# Patient Record
Sex: FEMALE | URBAN - METROPOLITAN AREA
[De-identification: names, ages, dates, MRNs, and addresses within clinical notes are randomized per-mention and may not be internally consistent; named-entity substitution may affect disease eponyms.]

---

## 2018-02-14 ENCOUNTER — NEW PATIENT (OUTPATIENT)
Dept: URBAN - METROPOLITAN AREA CLINIC 87 | Facility: CLINIC | Age: 63
End: 2018-02-14

## 2018-02-14 DIAGNOSIS — H43.391: ICD-10-CM

## 2018-02-14 DIAGNOSIS — H31.012: ICD-10-CM

## 2018-02-14 DIAGNOSIS — H43.813: ICD-10-CM

## 2018-02-14 PROCEDURE — G8427 DOCREV CUR MEDS BY ELIG CLIN: HCPCS

## 2018-02-14 PROCEDURE — 92225 OPHTHALMOSCOPY (INITIAL): CPT

## 2018-02-14 PROCEDURE — 4040F PNEUMOC VAC/ADMIN/RCVD: CPT

## 2018-02-14 PROCEDURE — G8482 FLU IMMUNIZE ORDER/ADMIN: HCPCS

## 2018-02-14 PROCEDURE — 99204 OFFICE O/P NEW MOD 45 MIN: CPT

## 2018-02-14 PROCEDURE — 92134 CPTRZ OPH DX IMG PST SGM RTA: CPT

## 2018-02-14 PROCEDURE — 1036F TOBACCO NON-USER: CPT

## 2018-02-14 ASSESSMENT — VISUAL ACUITY
OD_SC: 20/30
OD_CC: 20/30
OD_PH: 20/25
OS_SC: 20/30
OS_PH: 20/20
OS_CC: 20/25

## 2018-02-14 ASSESSMENT — TONOMETRY
OD_IOP_MMHG: 23
OS_IOP_MMHG: 22

## 2022-06-19 ENCOUNTER — APPOINTMENT (OUTPATIENT)
Dept: RADIOLOGY | Age: 67
End: 2022-06-19
Payer: MEDICARE

## 2022-06-19 ENCOUNTER — OFFICE VISIT (OUTPATIENT)
Dept: URGENT CARE | Age: 67
End: 2022-06-19
Payer: MEDICARE

## 2022-06-19 VITALS
HEART RATE: 88 BPM | WEIGHT: 170 LBS | HEIGHT: 61 IN | BODY MASS INDEX: 32.1 KG/M2 | SYSTOLIC BLOOD PRESSURE: 182 MMHG | RESPIRATION RATE: 18 BRPM | TEMPERATURE: 97 F | DIASTOLIC BLOOD PRESSURE: 86 MMHG

## 2022-06-19 DIAGNOSIS — M25.511 ACUTE PAIN OF RIGHT SHOULDER: Primary | ICD-10-CM

## 2022-06-19 DIAGNOSIS — M25.511 ACUTE PAIN OF RIGHT SHOULDER: ICD-10-CM

## 2022-06-19 PROCEDURE — 99213 OFFICE O/P EST LOW 20 MIN: CPT | Performed by: NURSE PRACTITIONER

## 2022-06-19 PROCEDURE — G0463 HOSPITAL OUTPT CLINIC VISIT: HCPCS | Performed by: NURSE PRACTITIONER

## 2022-06-19 PROCEDURE — 73030 X-RAY EXAM OF SHOULDER: CPT

## 2022-06-19 RX ORDER — PREDNISONE 50 MG/1
50 TABLET ORAL DAILY
Qty: 5 TABLET | Refills: 0 | Status: SHIPPED | OUTPATIENT
Start: 2022-06-19 | End: 2022-06-24

## 2022-06-19 RX ORDER — FLUTICASONE PROPIONATE AND SALMETEROL 250; 50 UG/1; UG/1
2 POWDER RESPIRATORY (INHALATION) 2 TIMES DAILY
COMMUNITY
Start: 2022-05-26

## 2022-06-19 RX ORDER — METHOCARBAMOL 500 MG/1
500 TABLET, FILM COATED ORAL 3 TIMES DAILY PRN
Qty: 20 TABLET | Refills: 0 | Status: SHIPPED | OUTPATIENT
Start: 2022-06-19

## 2022-06-19 RX ORDER — ASPIRIN 81 MG/1
81 TABLET ORAL
COMMUNITY

## 2022-06-19 NOTE — PROGRESS NOTES
[facility]        NAME: Katie Light is a 77 y o  female  : 1955    MRN: [id number]  DATE: 2022  TIME: 4:51 PM    Assessment and Plan   Acute pain of right shoulder [M25 511]  1  Acute pain of right shoulder  XR shoulder 2+ vw right    predniSONE 50 mg tablet    methocarbamol (ROBAXIN) 500 mg tablet         Patient Instructions     Possible re-occurrence of tendinitis   Take Rx meds as prescribed  Muscle relaxant may cause drowsiness   Follow up with PCP in 3-5 days  Proceed to  ER if symptoms worsen  Chief Complaint     Chief Complaint   Patient presents with    Shoulder Pain     Started with with right shoulder pain yesterday- she started with not able to raise her arm above her head- this am it is painful and numbness down to her finger tips and starting to radiated into her neck area- Has a history of calcium deposit in her left shoulder- she is concerned because he daughter is getting  today         History of Present Illness       HPI   Reports right shoulder pain that started yesterday  No injury  Reports pain of 10/10  Sharp, radiates down into the elbow  Previous Hx of calcific tendinitis, initially diagnosed 3-4 years ago  Hx of surgery on the left shoulder  Review of Systems   Review of Systems   Constitutional: Negative for fever  Musculoskeletal: Positive for arthralgias (right shoulder)  Negative for joint swelling and neck pain  Skin: Negative for rash and wound  Neurological: Positive for weakness (bc of pain)  Negative for tremors and numbness           Current Medications       Current Outpatient Medications:     methocarbamol (ROBAXIN) 500 mg tablet, Take 1 tablet (500 mg total) by mouth 3 (three) times a day as needed for muscle spasms (may cause drowsiness), Disp: 20 tablet, Rfl: 0    predniSONE 50 mg tablet, Take 1 tablet (50 mg total) by mouth daily for 5 days, Disp: 5 tablet, Rfl: 0    aspirin (ECOTRIN LOW STRENGTH) 81 mg EC tablet, Take 81 mg by mouth, Disp: , Rfl:     Ventolin  (90 Base) MCG/ACT inhaler, , Disp: , Rfl:     Yoel Longest 250-50 MCG/ACT inhaler, 2 puffs 2 (two) times a day, Disp: , Rfl:     Current Allergies     Allergies as of 06/19/2022 - Reviewed 06/19/2022   Allergen Reaction Noted    Codeine Hypertension 06/19/2020    Acetaminophen Hypertension 06/19/2020            The following portions of the patient's history were reviewed and updated as appropriate: allergies, current medications, past family history, past medical history, past social history, past surgical history and problem list      No past medical history on file  No past surgical history on file  No family history on file  Medications have been verified  Objective   BP (!) 182/86 (BP Location: Left arm, Patient Position: Sitting, Cuff Size: Standard)   Pulse 88   Temp (!) 97 °F (36 1 °C)   Resp 18   Ht 5' 1" (1 549 m)   Wt 77 1 kg (170 lb)   BMI 32 12 kg/m²   No LMP recorded  Patient is postmenopausal        Physical Exam     Physical Exam  Constitutional:       Appearance: She is not ill-appearing or diaphoretic  Musculoskeletal:         General: Tenderness (with palpation of the right shoulder and surrounding muscles  Limited ROM with internal rotation and with elevation of the right arm laterally to about 60 degrees   trength is normal  ) present  No swelling  Comments: ROM of the elbow is normal but with tenderness along the shoulder and the extreme of elbow flexion and extension   Skin:     Capillary Refill: Capillary refill takes less than 2 seconds  Findings: No bruising or erythema

## 2025-08-09 DIAGNOSIS — Z00.6 ENCOUNTER FOR EXAMINATION FOR NORMAL COMPARISON OR CONTROL IN CLINICAL RESEARCH PROGRAM: ICD-10-CM
